# Patient Record
Sex: FEMALE | Race: BLACK OR AFRICAN AMERICAN | URBAN - METROPOLITAN AREA
[De-identification: names, ages, dates, MRNs, and addresses within clinical notes are randomized per-mention and may not be internally consistent; named-entity substitution may affect disease eponyms.]

---

## 2021-01-08 ENCOUNTER — WEB ENCOUNTER (OUTPATIENT)
Dept: URBAN - METROPOLITAN AREA CLINIC 84 | Facility: CLINIC | Age: 36
End: 2021-01-08

## 2021-01-08 ENCOUNTER — OFFICE VISIT (OUTPATIENT)
Dept: URBAN - METROPOLITAN AREA CLINIC 84 | Facility: CLINIC | Age: 36
End: 2021-01-08
Payer: COMMERCIAL

## 2021-01-08 DIAGNOSIS — R19.7 DIARRHEA: ICD-10-CM

## 2021-01-08 DIAGNOSIS — R19.4 CHANGE IN BOWEL HABIT: ICD-10-CM

## 2021-01-08 DIAGNOSIS — R10.31 RIGHT LOWER QUADRANT ABDOMINAL PAIN: ICD-10-CM

## 2021-01-08 PROCEDURE — 99204 OFFICE O/P NEW MOD 45 MIN: CPT | Performed by: INTERNAL MEDICINE

## 2021-01-08 PROCEDURE — G8427 DOCREV CUR MEDS BY ELIG CLIN: HCPCS | Performed by: INTERNAL MEDICINE

## 2021-01-08 PROCEDURE — G8420 CALC BMI NORM PARAMETERS: HCPCS | Performed by: INTERNAL MEDICINE

## 2021-01-08 PROCEDURE — G9903 PT SCRN TBCO ID AS NON USER: HCPCS | Performed by: INTERNAL MEDICINE

## 2021-01-08 PROCEDURE — G8483 FLU IMM NO ADMIN DOC REA: HCPCS | Performed by: INTERNAL MEDICINE

## 2021-01-08 RX ORDER — DICYCLOMINE HYDROCHLORIDE 20 MG/1
1- 2 TABLETS TABLET ORAL
Qty: 60 | Refills: 2 | OUTPATIENT
Start: 2021-01-08 | End: 2021-04-08

## 2021-01-08 RX ORDER — SODIUM, POTASSIUM,MAG SULFATES 17.5-3.13G
354 ML SOLUTION, RECONSTITUTED, ORAL ORAL
Qty: 354 ML | Refills: 0 | OUTPATIENT
Start: 2021-01-08 | End: 2021-01-09

## 2021-01-08 NOTE — HPI-TODAY'S VISIT:
35 year old woman presenting with multiple GI complaints.  She has been having GI symptoms for a few months.  She has been having abdominal pain.  This feels like "labor" pain.  She was having diarrhea and she needed Imodium.  Since 12/25,  she is still having loose BM's.  SHe denies LGI bleed or melena.  The pain gets worse with a BM.  The pain is a cramping pain.  The pain has been getting worse lately.  She went to the [facility] ER 2 days ago.  According to the patient she had normal labs and US.  She denies anorexia or weight loss.  She feels like the food "goes right through."  She has an urgency for a BM.  Prior to a few months ago she was having regular BM's.  She denies UGI symptoms.  She denies dysphagia.  She denies f/c/rash.  There is no Fhx of colon cancer or IBD.  She has never had a prior colonsocopy.  There has been no change in her Lithium dose.  She was staretd on Protonix and Carafate by the ER.  She denies regular EtOH.  She denies being on antibiotics prior to onset of her symptoms

## 2021-01-13 ENCOUNTER — OFFICE VISIT (OUTPATIENT)
Dept: URBAN - METROPOLITAN AREA SURGERY CENTER 20 | Facility: SURGERY CENTER | Age: 36
End: 2021-01-13
Payer: COMMERCIAL

## 2021-01-13 ENCOUNTER — TELEPHONE ENCOUNTER (OUTPATIENT)
Dept: URBAN - METROPOLITAN AREA CLINIC 92 | Facility: CLINIC | Age: 36
End: 2021-01-13

## 2021-01-13 DIAGNOSIS — K52.89 (LYMPHOCYTIC) MICROSCOPIC COLITIS: ICD-10-CM

## 2021-01-13 PROCEDURE — G8907 PT DOC NO EVENTS ON DISCHARG: HCPCS | Performed by: INTERNAL MEDICINE

## 2021-01-13 PROCEDURE — G9937 DIG OR SURV COLSCO: HCPCS | Performed by: INTERNAL MEDICINE

## 2021-01-13 PROCEDURE — 45380 COLONOSCOPY AND BIOPSY: CPT | Performed by: INTERNAL MEDICINE

## 2021-01-13 RX ORDER — MESALAMINE 1.2 G/1
4 TABLETS TABLET, DELAYED RELEASE ORAL ONCE A DAY
Qty: 120 | Refills: 5 | OUTPATIENT
Start: 2021-01-13 | End: 2021-07-12

## 2021-01-13 RX ORDER — PREDNISONE 10 MG/1
1 TABLET TABLET ORAL
Qty: 85 TABLET | Refills: 0 | OUTPATIENT
Start: 2021-01-13 | End: 2021-02-17

## 2021-01-13 RX ORDER — DICYCLOMINE HYDROCHLORIDE 20 MG/1
1- 2 TABLETS TABLET ORAL
Qty: 60 | Refills: 2 | Status: ACTIVE | COMMUNITY
Start: 2021-01-08 | End: 2021-04-08

## 2021-01-26 ENCOUNTER — OFFICE VISIT (OUTPATIENT)
Dept: URBAN - METROPOLITAN AREA CLINIC 25 | Facility: CLINIC | Age: 36
End: 2021-01-26
Payer: COMMERCIAL

## 2021-01-26 ENCOUNTER — WEB ENCOUNTER (OUTPATIENT)
Dept: URBAN - METROPOLITAN AREA CLINIC 25 | Facility: CLINIC | Age: 36
End: 2021-01-26

## 2021-01-26 VITALS
HEART RATE: 87 BPM | HEIGHT: 63 IN | TEMPERATURE: 97.5 F | BODY MASS INDEX: 23.39 KG/M2 | SYSTOLIC BLOOD PRESSURE: 107 MMHG | DIASTOLIC BLOOD PRESSURE: 74 MMHG | WEIGHT: 132 LBS

## 2021-01-26 DIAGNOSIS — R10.30 LOWER ABDOMINAL PAIN: ICD-10-CM

## 2021-01-26 DIAGNOSIS — R19.7 DIARRHEA: ICD-10-CM

## 2021-01-26 DIAGNOSIS — R68.81 EARLY SATIETY: ICD-10-CM

## 2021-01-26 DIAGNOSIS — K52.9 COLITIS: ICD-10-CM

## 2021-01-26 DIAGNOSIS — R10.13 EPIGASTRIC ABDOMINAL PAIN: ICD-10-CM

## 2021-01-26 PROCEDURE — G8483 FLU IMM NO ADMIN DOC REA: HCPCS | Performed by: INTERNAL MEDICINE

## 2021-01-26 PROCEDURE — G9903 PT SCRN TBCO ID AS NON USER: HCPCS | Performed by: INTERNAL MEDICINE

## 2021-01-26 PROCEDURE — G8427 DOCREV CUR MEDS BY ELIG CLIN: HCPCS | Performed by: INTERNAL MEDICINE

## 2021-01-26 PROCEDURE — 99214 OFFICE O/P EST MOD 30 MIN: CPT | Performed by: INTERNAL MEDICINE

## 2021-01-26 PROCEDURE — G8420 CALC BMI NORM PARAMETERS: HCPCS | Performed by: INTERNAL MEDICINE

## 2021-01-26 RX ORDER — DICYCLOMINE HYDROCHLORIDE 20 MG/1
1- 2 TABLETS TABLET ORAL
Qty: 60 | Refills: 2 | Status: ACTIVE | COMMUNITY
Start: 2021-01-08 | End: 2021-04-08

## 2021-01-26 RX ORDER — PREDNISONE 10 MG/1
1 TABLET TABLET ORAL
Qty: 85 TABLET | Refills: 0 | Status: ACTIVE | COMMUNITY
Start: 2021-01-13 | End: 2021-02-17

## 2021-01-26 RX ORDER — TRAMADOL HYDROCHLORIDE 50 MG/1
1-2 TABLETS TABLET, FILM COATED ORAL
Qty: 40 | Refills: 0 | OUTPATIENT
Start: 2021-01-26 | End: 2021-02-02

## 2021-01-26 RX ORDER — MESALAMINE 1.2 G/1
4 TABLETS TABLET, DELAYED RELEASE ORAL ONCE A DAY
Qty: 120 | Refills: 5 | Status: ACTIVE | COMMUNITY
Start: 2021-01-13 | End: 2021-07-12

## 2021-01-26 NOTE — PHYSICAL EXAM GASTROINTESTINAL
epigastrium tenderness, Abdomen , soft, nontender, nondistended , no guarding or rigidity , no masses palpable , normal bowel sounds , Liver and Spleen , no hepatomegaly present , no hepatosplenomegaly , liver nontender , spleen not palpable , left upper quadrant tenderness , right upper quadrant tenderness

## 2021-01-26 NOTE — HPI-TODAY'S VISIT:
Colonoscopy on 1/13 showed patchy inflammation that seemed c/w IBD.  We gave her a steroid taper and mesalamine.  Path did show patchy acute inflammation but no chronic changes.  She has been taking the medications.  After she started eating some regular food she started having some recurrent symptoms.  She has a lot of abdominal cramp.  This is constant.  She has trouble standing up for long periods of time.  She feels like she can't eat.  She has an epigastric pain as soon as she eats.  The nausea has improved.  She has early satiety.  She has not vomited.  She denies dysphagia.  She has lost 8 pounds since the last OV.  She has irregular BM's.  She has loose stool with mucus.  She denies LGIbleed or melena.  The pain does not decrease with the Bentyl

## 2021-01-27 ENCOUNTER — TELEPHONE ENCOUNTER (OUTPATIENT)
Dept: URBAN - METROPOLITAN AREA CLINIC 92 | Facility: CLINIC | Age: 36
End: 2021-01-27

## 2021-01-27 ENCOUNTER — OFFICE VISIT (OUTPATIENT)
Dept: URBAN - METROPOLITAN AREA SURGERY CENTER 20 | Facility: SURGERY CENTER | Age: 36
End: 2021-01-27
Payer: COMMERCIAL

## 2021-01-27 DIAGNOSIS — K29.30 CHRONIC SUPERFICIAL GASTRITIS: ICD-10-CM

## 2021-01-27 DIAGNOSIS — K21.00 ALKALINE REFLUX ESOPHAGITIS: ICD-10-CM

## 2021-01-27 PROCEDURE — 43239 EGD BIOPSY SINGLE/MULTIPLE: CPT | Performed by: INTERNAL MEDICINE

## 2021-01-27 PROCEDURE — G8907 PT DOC NO EVENTS ON DISCHARG: HCPCS | Performed by: INTERNAL MEDICINE

## 2021-01-27 RX ORDER — TRAMADOL HYDROCHLORIDE 50 MG/1
1-2 TABLETS TABLET, FILM COATED ORAL
Qty: 40 | Refills: 0 | Status: ACTIVE | COMMUNITY
Start: 2021-01-26 | End: 2021-02-02

## 2021-01-27 RX ORDER — MESALAMINE 1.2 G/1
4 TABLETS TABLET, DELAYED RELEASE ORAL ONCE A DAY
Qty: 120 | Refills: 5 | Status: ACTIVE | COMMUNITY
Start: 2021-01-13 | End: 2021-07-12

## 2021-01-27 RX ORDER — DICYCLOMINE HYDROCHLORIDE 20 MG/1
1- 2 TABLETS TABLET ORAL
Qty: 60 | Refills: 2 | Status: ACTIVE | COMMUNITY
Start: 2021-01-08 | End: 2021-04-08

## 2021-01-27 RX ORDER — PANTOPRAZOLE SODIUM 40 MG/1
1 TABLET TABLET, DELAYED RELEASE ORAL ONCE A DAY
Qty: 30 TABLET | Refills: 5 | OUTPATIENT
Start: 2021-01-27

## 2021-01-27 RX ORDER — PREDNISONE 10 MG/1
1 TABLET TABLET ORAL
Qty: 85 TABLET | Refills: 0 | Status: ACTIVE | COMMUNITY
Start: 2021-01-13 | End: 2021-02-17

## 2021-02-05 ENCOUNTER — OFFICE VISIT (OUTPATIENT)
Dept: URBAN - METROPOLITAN AREA CLINIC 84 | Facility: CLINIC | Age: 36
End: 2021-02-05
Payer: COMMERCIAL

## 2021-02-05 DIAGNOSIS — K51.90 ULCERATIVE COLITIS: ICD-10-CM

## 2021-02-05 PROCEDURE — G8420 CALC BMI NORM PARAMETERS: HCPCS | Performed by: INTERNAL MEDICINE

## 2021-02-05 PROCEDURE — G8427 DOCREV CUR MEDS BY ELIG CLIN: HCPCS | Performed by: INTERNAL MEDICINE

## 2021-02-05 PROCEDURE — G9903 PT SCRN TBCO ID AS NON USER: HCPCS | Performed by: INTERNAL MEDICINE

## 2021-02-05 PROCEDURE — 99213 OFFICE O/P EST LOW 20 MIN: CPT | Performed by: INTERNAL MEDICINE

## 2021-02-05 PROCEDURE — G8483 FLU IMM NO ADMIN DOC REA: HCPCS | Performed by: INTERNAL MEDICINE

## 2021-02-05 RX ORDER — MESALAMINE 1.2 G/1
4 TABLETS TABLET, DELAYED RELEASE ORAL ONCE A DAY
Qty: 120 | Refills: 5 | Status: ACTIVE | COMMUNITY
Start: 2021-01-13 | End: 2021-07-12

## 2021-02-05 RX ORDER — PREDNISONE 10 MG/1
1 TABLET TABLET ORAL
Qty: 85 TABLET | Refills: 0 | Status: ACTIVE | COMMUNITY
Start: 2021-01-13 | End: 2021-02-17

## 2021-02-05 RX ORDER — DICYCLOMINE HYDROCHLORIDE 20 MG/1
1- 2 TABLETS TABLET ORAL
Qty: 60 | Refills: 2 | Status: ACTIVE | COMMUNITY
Start: 2021-01-08 | End: 2021-04-08

## 2021-02-05 RX ORDER — PANTOPRAZOLE SODIUM 40 MG/1
1 TABLET TABLET, DELAYED RELEASE ORAL ONCE A DAY
Qty: 30 TABLET | Refills: 5 | Status: ACTIVE | COMMUNITY
Start: 2021-01-27

## 2021-02-05 NOTE — HPI-TODAY'S VISIT:
EGD last week had some mild gastritis.  We are still awaiting path results.  She went to the [facility] ER on 2/2.  She had a CT Scan there but we do not have those results.  She was told that she had colitis.  They prescribed Cipro/Percocet/Flagyl.  She is still on the Prednisone/Lialda/Bentyl.  They also told her to stay on Prednisone 40 mg a day. She had been down to 20 mg a day. Her pain increases when she eats.  She has a constant pain.  It's a lower abdominal pain. The pain is sharp/spasm/cramp/heaviness.  She has regular BM's.  She denies constipation.  She denies LGI bleed.  She denies UGI symptoms.  She has not been taking the Bentyl.  She has been taking percocet from the ER.  Upon further questioning she has been having watery stools.

## 2021-02-11 ENCOUNTER — TELEPHONE ENCOUNTER (OUTPATIENT)
Dept: URBAN - METROPOLITAN AREA SURGERY CENTER 30 | Facility: SURGERY CENTER | Age: 36
End: 2021-02-11

## 2021-02-22 ENCOUNTER — TELEPHONE ENCOUNTER (OUTPATIENT)
Dept: URBAN - METROPOLITAN AREA CLINIC 84 | Facility: CLINIC | Age: 36
End: 2021-02-22

## 2021-03-03 ENCOUNTER — TELEPHONE ENCOUNTER (OUTPATIENT)
Dept: URBAN - METROPOLITAN AREA CLINIC 25 | Facility: CLINIC | Age: 36
End: 2021-03-03

## 2021-03-03 RX ORDER — DICYCLOMINE HYDROCHLORIDE 20 MG/1
1- 2 TABLETS TABLET ORAL
Qty: 60 | Refills: 2 | Status: ACTIVE | COMMUNITY
Start: 2021-01-08 | End: 2021-04-08

## 2021-03-03 RX ORDER — MESALAMINE 1.2 G/1
4 TABLETS TABLET, DELAYED RELEASE ORAL ONCE A DAY
Qty: 120 | Refills: 5 | Status: ACTIVE | COMMUNITY
Start: 2021-01-13 | End: 2021-07-12

## 2021-03-03 RX ORDER — PANTOPRAZOLE SODIUM 40 MG/1
1 TABLET TABLET, DELAYED RELEASE ORAL ONCE A DAY
Qty: 30 TABLET | Refills: 5 | Status: ACTIVE | COMMUNITY
Start: 2021-01-27

## 2021-03-03 RX ORDER — OXYCODONE HYDROCHLORIDE AND ACETAMINOPHEN 5; 325 MG/1; MG/1
1-2 TABLET AS NEEDED TABLET ORAL
Qty: 40 | Refills: 0 | OUTPATIENT
Start: 2021-03-03 | End: 2021-03-17

## 2021-03-05 ENCOUNTER — TELEPHONE ENCOUNTER (OUTPATIENT)
Dept: URBAN - METROPOLITAN AREA CLINIC 92 | Facility: CLINIC | Age: 36
End: 2021-03-05

## 2021-03-05 PROBLEM — 50440006: Status: ACTIVE | Noted: 2021-03-05

## 2021-03-13 LAB
ALBUMIN: 3.8
ALKALINE PHOSPHATASE: 73
ALT (SGPT): 63
AST (SGOT): 39
BASO (ABSOLUTE): 0.1
BASOS: 0
BILIRUBIN, DIRECT: 0.13
BILIRUBIN, TOTAL: 0.4
BUN/CREATININE RATIO: 13
BUN: 7
C-REACTIVE PROTEIN, QUANT: 43
CARBON DIOXIDE, TOTAL: 23
CHLORIDE: 100
CREATININE: 0.55
EGFR IF AFRICN AM: 138
EGFR IF NONAFRICN AM: 119
EOS (ABSOLUTE): 0
EOS: 0
FERRITIN, SERUM: 478
FOLATE (FOLIC ACID), SERUM: 15.2
GLUCOSE: 102
HBSAG SCREEN: NEGATIVE
HEMATOCRIT: 43.8
HEMATOLOGY COMMENTS:: (no result)
HEMOGLOBIN: 13.5
HEP B SURFACE AB, QUAL: REACTIVE
IMMATURE CELLS: (no result)
IMMATURE GRANS (ABS): 0
IMMATURE GRANULOCYTES: 0
IRON BIND.CAP.(TIBC): 206
IRON SATURATION: 11
IRON: 23
LYMPHS (ABSOLUTE): 2.1
LYMPHS: 10
MCH: 26.3
MCHC: 30.8
MCV: 85
MONOCYTES(ABSOLUTE): 1
MONOCYTES: 5
NEUTROPHILS (ABSOLUTE): 18
NEUTROPHILS: 85
NRBC: (no result)
PLATELETS: 393
POTASSIUM: 4.3
PROTEIN, TOTAL: 5.8
QUANTIFERON CRITERIA: (no result)
QUANTIFERON INCUBATION: (no result)
QUANTIFERON MITOGEN VALUE: >10
QUANTIFERON NIL VALUE: 0.05
QUANTIFERON TB1 AG VALUE: 0.05
QUANTIFERON TB2 AG VALUE: 0.05
QUANTIFERON-TB GOLD PLUS: NEGATIVE
RBC: 5.14
RDW: 13.8
SODIUM: 137
UIBC: 183
VITAMIN B12: 1494
WBC: 21.3

## 2021-03-15 ENCOUNTER — TELEPHONE ENCOUNTER (OUTPATIENT)
Dept: URBAN - METROPOLITAN AREA CLINIC 84 | Facility: CLINIC | Age: 36
End: 2021-03-15

## 2021-03-15 RX ORDER — PANTOPRAZOLE SODIUM 40 MG/1
1 TABLET TABLET, DELAYED RELEASE ORAL ONCE A DAY
Qty: 30 TABLET | Refills: 5 | Status: ACTIVE | COMMUNITY
Start: 2021-01-27

## 2021-03-15 RX ORDER — MESALAMINE 1.2 G/1
4 TABLETS TABLET, DELAYED RELEASE ORAL ONCE A DAY
Qty: 120 | Refills: 5 | Status: ACTIVE | COMMUNITY
Start: 2021-01-13 | End: 2021-07-12

## 2021-03-15 RX ORDER — INFLIXIMAB 100 MG/10ML
AS DIRECTED INJECTION, POWDER, LYOPHILIZED, FOR SOLUTION INTRAVENOUS AS DIRECTED
Qty: 10 | Refills: 6 | OUTPATIENT
End: 2022-12-05

## 2021-03-15 RX ORDER — DICYCLOMINE HYDROCHLORIDE 20 MG/1
1- 2 TABLETS TABLET ORAL
Qty: 60 | Refills: 2 | Status: ACTIVE | COMMUNITY
Start: 2021-01-08 | End: 2021-04-08

## 2021-03-15 RX ORDER — OXYCODONE HYDROCHLORIDE AND ACETAMINOPHEN 5; 325 MG/1; MG/1
1-2 TABLET AS NEEDED TABLET ORAL
Qty: 40 | Refills: 0 | Status: ACTIVE | COMMUNITY
Start: 2021-03-03 | End: 2021-03-17

## 2021-03-16 ENCOUNTER — TELEPHONE ENCOUNTER (OUTPATIENT)
Dept: URBAN - METROPOLITAN AREA CLINIC 84 | Facility: CLINIC | Age: 36
End: 2021-03-16

## 2021-03-18 ENCOUNTER — TELEPHONE ENCOUNTER (OUTPATIENT)
Dept: URBAN - METROPOLITAN AREA CLINIC 84 | Facility: CLINIC | Age: 36
End: 2021-03-18

## 2021-03-19 ENCOUNTER — TELEPHONE ENCOUNTER (OUTPATIENT)
Dept: URBAN - METROPOLITAN AREA CLINIC 84 | Facility: CLINIC | Age: 36
End: 2021-03-19

## 2021-03-23 PROBLEM — 71833008 CROHN'S DISEASE OF SMALL AND LARGE INTESTINES: Status: ACTIVE | Noted: 2021-03-16

## 2021-03-27 ENCOUNTER — OUT OF OFFICE VISIT (OUTPATIENT)
Dept: URBAN - METROPOLITAN AREA MEDICAL CENTER 17 | Facility: MEDICAL CENTER | Age: 36
End: 2021-03-27
Payer: COMMERCIAL

## 2021-03-27 DIAGNOSIS — R10.84 ABDOMINAL CRAMPING, GENERALIZED: ICD-10-CM

## 2021-03-27 DIAGNOSIS — K63.89 BACTERIAL OVERGROWTH SYNDROME: ICD-10-CM

## 2021-03-27 DIAGNOSIS — R93.3 ABN FINDINGS-GI TRACT: ICD-10-CM

## 2021-03-27 PROCEDURE — 99254 IP/OBS CNSLTJ NEW/EST MOD 60: CPT | Performed by: INTERNAL MEDICINE

## 2021-03-27 PROCEDURE — 99232 SBSQ HOSP IP/OBS MODERATE 35: CPT | Performed by: INTERNAL MEDICINE

## 2021-03-28 ENCOUNTER — TELEPHONE ENCOUNTER (OUTPATIENT)
Dept: URBAN - METROPOLITAN AREA CLINIC 92 | Facility: CLINIC | Age: 36
End: 2021-03-28

## 2021-03-30 ENCOUNTER — OFFICE VISIT (OUTPATIENT)
Dept: URBAN - METROPOLITAN AREA CLINIC 114 | Facility: CLINIC | Age: 36
End: 2021-03-30

## 2021-04-02 ENCOUNTER — WEB ENCOUNTER (OUTPATIENT)
Dept: URBAN - METROPOLITAN AREA CLINIC 25 | Facility: CLINIC | Age: 36
End: 2021-04-02

## 2021-04-02 ENCOUNTER — OFFICE VISIT (OUTPATIENT)
Dept: URBAN - METROPOLITAN AREA CLINIC 25 | Facility: CLINIC | Age: 36
End: 2021-04-02
Payer: COMMERCIAL

## 2021-04-02 VITALS
BODY MASS INDEX: 23.81 KG/M2 | SYSTOLIC BLOOD PRESSURE: 131 MMHG | DIASTOLIC BLOOD PRESSURE: 83 MMHG | TEMPERATURE: 97.5 F | WEIGHT: 134.4 LBS | HEART RATE: 85 BPM | HEIGHT: 63 IN

## 2021-04-02 DIAGNOSIS — A49.8 CLOSTRIDIOIDES DIFFICILE INFECTION: ICD-10-CM

## 2021-04-02 DIAGNOSIS — A09 DIARRHEA OF INFECTIOUS ORIGIN: ICD-10-CM

## 2021-04-02 DIAGNOSIS — K21.9 GERD: ICD-10-CM

## 2021-04-02 DIAGNOSIS — K52.9 COLITIS: ICD-10-CM

## 2021-04-02 PROBLEM — 235595009 GASTROESOPHAGEAL REFLUX DISEASE: Status: ACTIVE | Noted: 2021-01-27

## 2021-04-02 PROCEDURE — 99214 OFFICE O/P EST MOD 30 MIN: CPT | Performed by: INTERNAL MEDICINE

## 2021-04-02 RX ORDER — MESALAMINE 1.2 G/1
4 TABLETS TABLET, DELAYED RELEASE ORAL ONCE A DAY
Qty: 120 | Refills: 5 | Status: ACTIVE | COMMUNITY
Start: 2021-01-13 | End: 2021-07-12

## 2021-04-02 RX ORDER — PANTOPRAZOLE SODIUM 40 MG/1
1 TABLET TABLET, DELAYED RELEASE ORAL ONCE A DAY
Qty: 30 TABLET | Refills: 5 | Status: ON HOLD | COMMUNITY
Start: 2021-01-27

## 2021-04-02 RX ORDER — INFLIXIMAB 100 MG/10ML
AS DIRECTED INJECTION, POWDER, LYOPHILIZED, FOR SOLUTION INTRAVENOUS AS DIRECTED
Qty: 10 | Refills: 6 | Status: ON HOLD | COMMUNITY
End: 2022-12-05

## 2021-04-02 RX ORDER — DICYCLOMINE HYDROCHLORIDE 20 MG/1
1- 2 TABLETS TABLET ORAL
Qty: 60 | Refills: 2 | Status: ON HOLD | COMMUNITY
Start: 2021-01-08 | End: 2021-04-08

## 2021-04-02 NOTE — HPI-OTHER HISTORIES
Feb 2021 : dr gray EGD last week had some mild gastritis.  We are still awaiting path results.  She went to the [facility] ER on 2/2.  She had a CT Scan there but we do not have those results.  She was told that she had colitis.  They prescribed Cipro/Percocet/Flagyl.  She is still on the Prednisone/Lialda/Bentyl.  They also told her to stay on Prednisone 40 mg a day. She had been down to 20 mg a day. Her pain increases when she eats.  She has a constant pain.  It's a lower abdominal pain. The pain is sharp/spasm/cramp/heaviness.  She has regular BM's.  She denies constipation.  She denies LGI bleed.  She denies UGI symptoms.  She has not been taking the Bentyl.  She has been taking percocet from the ER.  Upon further questioning she has been having watery stools.

## 2021-04-02 NOTE — HPI-TODAY'S VISIT:
April 2021: On vancomycin. bowel consistency is better. no abdominal pain. not on steroids. also stopped mesalamine. reports on - off symptoms for over a yr but was always told she had IBS. Took imdoium prn.   EGD in Jan 2021 revealed LA grade a esophagitis and gastritis, path did not reveal any H. pylori, small bowel biopsies were benign. Colonoscopy in Jan 2021 revealed normal TI, patchy moderate inflammation involving the entire colon.  Normal TI biopsy, focal active colitis in ascending colon and active colitis in the sigmoid colon with remaining colon biopsies including back from the rectum being normal. Prometheus panel revealed a pattern consistent with IBD and Dr. Fuentes wanted to  start the patient on Remicade.  She was hospitalized in March 2021 at [facility] with flareup and was initially treated with Cipro and Flagyl as well as IV steroids which did improve her symptoms, CT showed colitis, leukocytosis on labs, stool tests revealed presence of C. difficile without toxin, therefore patient was sent home on Flagyl and vancomycin and advised to hold off on steroids and continue Lialda.

## 2021-04-06 ENCOUNTER — OFFICE VISIT (OUTPATIENT)
Dept: URBAN - METROPOLITAN AREA CLINIC 25 | Facility: CLINIC | Age: 36
End: 2021-04-06
Payer: COMMERCIAL

## 2021-04-06 VITALS
WEIGHT: 134.4 LBS | SYSTOLIC BLOOD PRESSURE: 117 MMHG | BODY MASS INDEX: 23.81 KG/M2 | DIASTOLIC BLOOD PRESSURE: 81 MMHG | HEART RATE: 96 BPM | HEIGHT: 63 IN | TEMPERATURE: 97.6 F

## 2021-04-06 DIAGNOSIS — A04.72 C. DIFFICILE COLITIS: ICD-10-CM

## 2021-04-06 DIAGNOSIS — K51.90 ULCERATIVE COLITIS: ICD-10-CM

## 2021-04-06 PROCEDURE — 99213 OFFICE O/P EST LOW 20 MIN: CPT | Performed by: INTERNAL MEDICINE

## 2021-04-06 RX ORDER — PANTOPRAZOLE SODIUM 40 MG/1
1 TABLET TABLET, DELAYED RELEASE ORAL ONCE A DAY
Qty: 30 TABLET | Refills: 5 | COMMUNITY
Start: 2021-01-27

## 2021-04-06 RX ORDER — MESALAMINE 1.2 G/1
4 TABLETS TABLET, DELAYED RELEASE ORAL ONCE A DAY
Qty: 120 | Refills: 5 | Status: ACTIVE | COMMUNITY
Start: 2021-01-13 | End: 2021-07-12

## 2021-04-06 RX ORDER — INFLIXIMAB 100 MG/10ML
AS DIRECTED INJECTION, POWDER, LYOPHILIZED, FOR SOLUTION INTRAVENOUS AS DIRECTED
Qty: 10 | Refills: 6 | COMMUNITY
End: 2022-12-05

## 2021-04-06 RX ORDER — DICYCLOMINE HYDROCHLORIDE 20 MG/1
1- 2 TABLETS TABLET ORAL
Qty: 60 | Refills: 2 | COMMUNITY
Start: 2021-01-08 | End: 2021-04-08

## 2021-04-06 NOTE — HPI-TODAY'S VISIT:
Patient seen by Dr. Fernandez last week.  She is on Day # 9 of [facility].  She feels a lot better.  She still has loose stool.  Her stool frequency has decreased.  She still has about 5 loose BM's a day.  She denies hematochezia.  She denies abdominal pain.  Her stool no longer has a foul odor. The foul odor was more recent.  She is no longer on the Prednisone.  She has not gotten any Remicade yet.  She denies anorexia or weight loss.  She denies significant UGI symptoms

## 2021-04-13 ENCOUNTER — OFFICE VISIT (OUTPATIENT)
Dept: URBAN - METROPOLITAN AREA CLINIC 114 | Facility: CLINIC | Age: 36
End: 2021-04-13

## 2021-04-19 LAB
C DIFFICILE TOXIN GENE NAA: NEGATIVE
C DIFFICILE TOXINS A+B, EIA: NEGATIVE

## 2021-04-29 ENCOUNTER — TELEPHONE ENCOUNTER (OUTPATIENT)
Dept: URBAN - METROPOLITAN AREA CLINIC 25 | Facility: CLINIC | Age: 36
End: 2021-04-29

## 2021-05-11 ENCOUNTER — OFFICE VISIT (OUTPATIENT)
Dept: URBAN - METROPOLITAN AREA CLINIC 114 | Facility: CLINIC | Age: 36
End: 2021-05-11

## 2021-06-16 ENCOUNTER — OFFICE VISIT (OUTPATIENT)
Dept: URBAN - METROPOLITAN AREA CLINIC 25 | Facility: CLINIC | Age: 36
End: 2021-06-16
Payer: COMMERCIAL

## 2021-06-16 VITALS
DIASTOLIC BLOOD PRESSURE: 84 MMHG | TEMPERATURE: 98.6 F | WEIGHT: 141 LBS | SYSTOLIC BLOOD PRESSURE: 120 MMHG | HEIGHT: 63 IN | BODY MASS INDEX: 24.98 KG/M2 | HEART RATE: 76 BPM

## 2021-06-16 DIAGNOSIS — K51.90 ULCERATIVE COLITIS: ICD-10-CM

## 2021-06-16 DIAGNOSIS — A04.72 C. DIFFICILE COLITIS: ICD-10-CM

## 2021-06-16 PROCEDURE — 99213 OFFICE O/P EST LOW 20 MIN: CPT | Performed by: INTERNAL MEDICINE

## 2021-06-16 RX ORDER — INFLIXIMAB 100 MG/10ML
AS DIRECTED INJECTION, POWDER, LYOPHILIZED, FOR SOLUTION INTRAVENOUS AS DIRECTED
Qty: 10 | Refills: 6 | COMMUNITY
End: 2022-12-05

## 2021-06-16 RX ORDER — PANTOPRAZOLE SODIUM 40 MG/1
1 TABLET TABLET, DELAYED RELEASE ORAL ONCE A DAY
Qty: 30 TABLET | Refills: 5 | COMMUNITY
Start: 2021-01-27

## 2021-06-16 RX ORDER — MESALAMINE 1.2 G/1
4 TABLETS TABLET, DELAYED RELEASE ORAL ONCE A DAY
Qty: 120 | Refills: 5 | COMMUNITY
Start: 2021-01-13 | End: 2021-07-12

## 2021-06-16 NOTE — HPI-TODAY'S VISIT:
Patient last seen 4/2021.  Stool C Diff toxin and PCR were negative.  The insurance company denied Remicade and wanted her to first try Inflectra and stated we would first need a peer to peer for this.  Since she finished Vanco she had been feeling fine.  She was not having pain.  She was having diarrhea even after she compelted the Vanco. She has 3-4 loose/soft BM's a day.  She denies LGI bleed or melena.  Over the past 2 weeks she has had some FI.  She has been having lower abdominal cramping pain.  Her stools have been more loose over the past 2 weeks.  She has had increased frequency.  She has urgency for a BM.  She feels like over the pat 2 weeks her symptoms have gotten worse.  She denies anroexia or weight loss.  She denies UGI symptoms.  She was on Augmentin a few weeks ago.  Her increased cramping and loose stools started prior to being on the antibiotics

## 2021-06-28 ENCOUNTER — TELEPHONE ENCOUNTER (OUTPATIENT)
Dept: URBAN - METROPOLITAN AREA CLINIC 84 | Facility: CLINIC | Age: 36
End: 2021-06-28

## 2021-07-08 LAB — C DIFFICILE TOXINS A+B, EIA: NEGATIVE

## 2021-07-19 ENCOUNTER — TELEPHONE ENCOUNTER (OUTPATIENT)
Dept: URBAN - METROPOLITAN AREA CLINIC 84 | Facility: CLINIC | Age: 36
End: 2021-07-19

## 2021-08-16 ENCOUNTER — TELEPHONE ENCOUNTER (OUTPATIENT)
Dept: URBAN - METROPOLITAN AREA CLINIC 84 | Facility: CLINIC | Age: 36
End: 2021-08-16

## 2021-08-23 ENCOUNTER — OFFICE VISIT (OUTPATIENT)
Dept: URBAN - METROPOLITAN AREA SURGERY CENTER 20 | Facility: SURGERY CENTER | Age: 36
End: 2021-08-23
Payer: COMMERCIAL

## 2021-08-23 DIAGNOSIS — R19.7 ACUTE DIARRHEA: ICD-10-CM

## 2021-08-23 PROCEDURE — G8907 PT DOC NO EVENTS ON DISCHARG: HCPCS | Performed by: INTERNAL MEDICINE

## 2021-08-23 PROCEDURE — 45331 SIGMOIDOSCOPY AND BIOPSY: CPT | Performed by: INTERNAL MEDICINE

## 2021-08-23 RX ORDER — INFLIXIMAB 100 MG/10ML
AS DIRECTED INJECTION, POWDER, LYOPHILIZED, FOR SOLUTION INTRAVENOUS AS DIRECTED
Qty: 10 | Refills: 6 | COMMUNITY
End: 2022-12-05

## 2021-08-23 RX ORDER — PANTOPRAZOLE SODIUM 40 MG/1
1 TABLET TABLET, DELAYED RELEASE ORAL ONCE A DAY
Qty: 30 TABLET | Refills: 5 | COMMUNITY
Start: 2021-01-27

## 2021-09-02 ENCOUNTER — TELEPHONE ENCOUNTER (OUTPATIENT)
Dept: URBAN - METROPOLITAN AREA CLINIC 92 | Facility: CLINIC | Age: 36
End: 2021-09-02

## 2022-02-02 ENCOUNTER — OFFICE VISIT (OUTPATIENT)
Dept: URBAN - METROPOLITAN AREA CLINIC 25 | Facility: CLINIC | Age: 37
End: 2022-02-02
Payer: COMMERCIAL

## 2022-02-02 VITALS
HEART RATE: 73 BPM | BODY MASS INDEX: 26.93 KG/M2 | HEIGHT: 63 IN | DIASTOLIC BLOOD PRESSURE: 81 MMHG | SYSTOLIC BLOOD PRESSURE: 124 MMHG | TEMPERATURE: 98 F | WEIGHT: 152 LBS

## 2022-02-02 DIAGNOSIS — A04.72 C. DIFFICILE COLITIS: ICD-10-CM

## 2022-02-02 DIAGNOSIS — K51.90 ULCERATIVE COLITIS: ICD-10-CM

## 2022-02-02 PROCEDURE — 99213 OFFICE O/P EST LOW 20 MIN: CPT | Performed by: INTERNAL MEDICINE

## 2022-02-02 RX ORDER — PANTOPRAZOLE SODIUM 40 MG/1
1 TABLET TABLET, DELAYED RELEASE ORAL ONCE A DAY
Qty: 30 TABLET | Refills: 5 | COMMUNITY
Start: 2021-01-27

## 2022-02-02 RX ORDER — DICYCLOMINE HYDROCHLORIDE 20 MG/1
1- 2 TABLETS TABLET ORAL
Qty: 60 | Refills: 5 | OUTPATIENT
Start: 2022-02-02 | End: 2022-08-01

## 2022-02-02 RX ORDER — INFLIXIMAB 100 MG/10ML
AS DIRECTED INJECTION, POWDER, LYOPHILIZED, FOR SOLUTION INTRAVENOUS AS DIRECTED
Qty: 10 | Refills: 6 | COMMUNITY
End: 2022-12-05

## 2022-02-02 NOTE — HPI-TODAY'S VISIT:
Flex Sig in 8/2021 was normal.  Overall she is doing well.  She ahs occasional abdominal pain.  This responds to Bentyl PRN.  Her bowels are moving well.  She has formed stool.  She has 1-2 BM's a day based on her diet.  She denies LGI bleed or melena.  She denies anorexia or weight loss.  She denies UGI symptoms.  She uses PPI PRN.  Overall she is satisfied her symptoms

## 2022-02-08 PROBLEM — 64766004 ULCERATIVE COLITIS: Status: ACTIVE | Noted: 2021-02-05

## 2023-11-15 ENCOUNTER — OFFICE VISIT (OUTPATIENT)
Dept: URBAN - METROPOLITAN AREA CLINIC 25 | Facility: CLINIC | Age: 38
End: 2023-11-15

## 2023-11-17 ENCOUNTER — OFFICE VISIT (OUTPATIENT)
Dept: URBAN - METROPOLITAN AREA CLINIC 84 | Facility: CLINIC | Age: 38
End: 2023-11-17

## 2023-11-17 ENCOUNTER — DASHBOARD ENCOUNTERS (OUTPATIENT)
Age: 38
End: 2023-11-17

## 2023-11-17 ENCOUNTER — OFFICE VISIT (OUTPATIENT)
Dept: URBAN - METROPOLITAN AREA CLINIC 84 | Facility: CLINIC | Age: 38
End: 2023-11-17
Payer: COMMERCIAL

## 2023-11-17 VITALS
HEIGHT: 63 IN | TEMPERATURE: 97.3 F | WEIGHT: 155.2 LBS | DIASTOLIC BLOOD PRESSURE: 80 MMHG | BODY MASS INDEX: 27.5 KG/M2 | HEART RATE: 83 BPM | SYSTOLIC BLOOD PRESSURE: 116 MMHG

## 2023-11-17 DIAGNOSIS — R10.30 LOWER ABDOMINAL PAIN: ICD-10-CM

## 2023-11-17 DIAGNOSIS — R68.81 EARLY SATIETY: ICD-10-CM

## 2023-11-17 DIAGNOSIS — K21.9 GERD: ICD-10-CM

## 2023-11-17 DIAGNOSIS — R11.0 NAUSEA: ICD-10-CM

## 2023-11-17 DIAGNOSIS — K51.90 ULCERATIVE COLITIS: ICD-10-CM

## 2023-11-17 PROCEDURE — 99214 OFFICE O/P EST MOD 30 MIN: CPT | Performed by: INTERNAL MEDICINE

## 2023-11-17 RX ORDER — PANTOPRAZOLE SODIUM 40 MG/1
1 TABLET TABLET, DELAYED RELEASE ORAL ONCE A DAY
Qty: 30 TABLET | Refills: 5 | OUTPATIENT
Start: 2023-11-17

## 2023-11-17 RX ORDER — DICYCLOMINE HYDROCHLORIDE 20 MG/1
1- 2 TABLETS TABLET ORAL
Qty: 60 | Refills: 5
Start: 2022-02-02 | End: 2024-05-15

## 2023-11-17 RX ORDER — PANTOPRAZOLE SODIUM 40 MG/1
1 TABLET TABLET, DELAYED RELEASE ORAL ONCE A DAY
Qty: 30 TABLET | Refills: 5 | COMMUNITY
Start: 2021-01-27

## 2023-11-17 RX ORDER — POLYETHYLENE GLYCOL 3350, SODIUM SULFATE, SODIUM CHLORIDE, POTASSIUM CHLORIDE, ASCORBIC ACID, SODIUM ASCORBATE 140-9-5.2G
AS DIRECTED KIT ORAL AS DIRECTED
Qty: 1 BOX | Refills: 0 | OUTPATIENT
Start: 2023-11-17 | End: 2023-11-18

## 2023-11-17 RX ORDER — PANTOPRAZOLE SODIUM 40 MG/1
1 TABLET TABLET, DELAYED RELEASE ORAL ONCE A DAY
Qty: 30 TABLET | Refills: 5 | Status: ACTIVE | COMMUNITY
Start: 2021-01-27

## 2023-11-17 NOTE — HPI-TODAY'S VISIT:
Patient last seen 2/2022.  She has abdominal cramp.  This does respond to Bentyl but sometimes it does not help.  The pain is more frequent and is sometimes severe.  She denies anorexia or weight loss.  Over the past 2 months she has had 2 episodes of severe constipation.  She had some blood on the tisse a few days again.  She denies diarrhea.  The pain gets worse if she doesn't have a BM.  She did not respond to Colace.  The pain does decrease with a BM.  She takes Protonix daily.  She has heartburn.  She denies supine symptoms.  She has nasuea but no vomiting.  She denies dysphagia.  Her UGI symptoms are not related to her constipation.  Prior Prometheus testing indicated that she has Crohns.  Prior Colonscopy in 1/2021 had active inflammation with no chronicity.  Flex Sig in 8/2021 was compeltely normal.  EGD was overall normal

## 2023-11-22 ENCOUNTER — OFFICE VISIT (OUTPATIENT)
Dept: URBAN - METROPOLITAN AREA CLINIC 25 | Facility: CLINIC | Age: 38
End: 2023-11-22

## 2023-11-27 ENCOUNTER — TELEPHONE ENCOUNTER (OUTPATIENT)
Dept: URBAN - METROPOLITAN AREA CLINIC 84 | Facility: CLINIC | Age: 38
End: 2023-11-27

## 2023-11-28 ENCOUNTER — TELEPHONE ENCOUNTER (OUTPATIENT)
Dept: URBAN - METROPOLITAN AREA CLINIC 84 | Facility: CLINIC | Age: 38
End: 2023-11-28

## 2023-12-12 ENCOUNTER — TELEPHONE ENCOUNTER (OUTPATIENT)
Dept: URBAN - METROPOLITAN AREA CLINIC 84 | Facility: CLINIC | Age: 38
End: 2023-12-12

## 2023-12-14 ENCOUNTER — OFFICE VISIT (OUTPATIENT)
Dept: URBAN - METROPOLITAN AREA SURGERY CENTER 20 | Facility: SURGERY CENTER | Age: 38
End: 2023-12-14

## 2023-12-14 ENCOUNTER — TELEPHONE ENCOUNTER (OUTPATIENT)
Dept: URBAN - METROPOLITAN AREA CLINIC 84 | Facility: CLINIC | Age: 38
End: 2023-12-14

## 2023-12-20 ENCOUNTER — CLAIMS CREATED FROM THE CLAIM WINDOW (OUTPATIENT)
Dept: URBAN - METROPOLITAN AREA CLINIC 4 | Facility: CLINIC | Age: 38
End: 2023-12-20
Payer: COMMERCIAL

## 2023-12-20 ENCOUNTER — OFFICE VISIT (OUTPATIENT)
Dept: URBAN - METROPOLITAN AREA SURGERY CENTER 20 | Facility: SURGERY CENTER | Age: 38
End: 2023-12-20

## 2023-12-20 ENCOUNTER — OUT OF OFFICE VISIT (OUTPATIENT)
Dept: URBAN - METROPOLITAN AREA SURGERY CENTER 20 | Facility: SURGERY CENTER | Age: 38
End: 2023-12-20
Payer: COMMERCIAL

## 2023-12-20 ENCOUNTER — TELEPHONE ENCOUNTER (OUTPATIENT)
Dept: URBAN - METROPOLITAN AREA CLINIC 84 | Facility: CLINIC | Age: 38
End: 2023-12-20

## 2023-12-20 ENCOUNTER — LAB OUTSIDE AN ENCOUNTER (OUTPATIENT)
Dept: URBAN - METROPOLITAN AREA CLINIC 84 | Facility: CLINIC | Age: 38
End: 2023-12-20

## 2023-12-20 DIAGNOSIS — R10.32 ABDOMINAL CRAMPING IN LEFT LOWER QUADRANT: ICD-10-CM

## 2023-12-20 DIAGNOSIS — K29.70 CHRONIC ACITVE GASTRITIS (H.PYLORI NEGATIVE): ICD-10-CM

## 2023-12-20 DIAGNOSIS — K20.80 ESOPHAGITIS DISSECANS SUPERFICIALIS: ICD-10-CM

## 2023-12-20 DIAGNOSIS — K21.00 ALKALINE REFLUX ESOPHAGITIS: ICD-10-CM

## 2023-12-20 DIAGNOSIS — R68.81 EARLY SATIETY: ICD-10-CM

## 2023-12-20 DIAGNOSIS — K31.89 OTHER DISEASES OF STOMACH AND DUODENUM: ICD-10-CM

## 2023-12-20 DIAGNOSIS — R10.13 ABDOMINAL DISCOMFORT, EPIGASTRIC: ICD-10-CM

## 2023-12-20 DIAGNOSIS — R10.31 ABDOMINAL CRAMPING IN RIGHT LOWER QUADRANT: ICD-10-CM

## 2023-12-20 DIAGNOSIS — R11.0 NAUSEA: ICD-10-CM

## 2023-12-20 DIAGNOSIS — K29.60 ADENOPAPILLOMATOSIS GASTRICA: ICD-10-CM

## 2023-12-20 DIAGNOSIS — K51.90 ACUTE ULCERATIVE COLITIS: ICD-10-CM

## 2023-12-20 PROCEDURE — 45378 DIAGNOSTIC COLONOSCOPY: CPT | Performed by: INTERNAL MEDICINE

## 2023-12-20 PROCEDURE — G8907 PT DOC NO EVENTS ON DISCHARG: HCPCS | Performed by: INTERNAL MEDICINE

## 2023-12-20 PROCEDURE — 43239 EGD BIOPSY SINGLE/MULTIPLE: CPT | Performed by: INTERNAL MEDICINE

## 2023-12-20 PROCEDURE — 00813 ANES UPR LWR GI NDSC PX: CPT | Performed by: NURSE ANESTHETIST, CERTIFIED REGISTERED

## 2023-12-20 PROCEDURE — 88312 SPECIAL STAINS GROUP 1: CPT | Performed by: PATHOLOGY

## 2023-12-20 PROCEDURE — 88305 TISSUE EXAM BY PATHOLOGIST: CPT | Performed by: PATHOLOGY

## 2023-12-20 RX ORDER — LINACLOTIDE 145 UG/1
1 CAPSULE AT LEAST 30 MINUTES BEFORE THE FIRST MEAL OF THE DAY ON AN EMPTY STOMACH CAPSULE, GELATIN COATED ORAL ONCE A DAY
Qty: 90 | Refills: 3 | OUTPATIENT
Start: 2023-12-20 | End: 2024-12-14

## 2023-12-20 RX ORDER — PANTOPRAZOLE SODIUM 40 MG/1
1 TABLET TABLET, DELAYED RELEASE ORAL ONCE A DAY
Qty: 30 TABLET | Refills: 5 | Status: ACTIVE | COMMUNITY
Start: 2023-11-17

## 2023-12-20 RX ORDER — DICYCLOMINE HYDROCHLORIDE 20 MG/1
1- 2 TABLETS TABLET ORAL
Qty: 60 | Refills: 5 | Status: ACTIVE | COMMUNITY
Start: 2022-02-02 | End: 2024-05-15

## 2023-12-20 RX ORDER — PANTOPRAZOLE SODIUM 40 MG/1
1 TABLET TABLET, DELAYED RELEASE ORAL ONCE A DAY
Qty: 30 TABLET | Refills: 5 | Status: ACTIVE | COMMUNITY
Start: 2021-01-27

## 2025-03-19 ENCOUNTER — ERX REFILL RESPONSE (OUTPATIENT)
Dept: URBAN - METROPOLITAN AREA CLINIC 84 | Facility: CLINIC | Age: 40
End: 2025-03-19

## 2025-03-19 RX ORDER — LINACLOTIDE 145 UG/1
TAKE 1 CAPSULE (145 MCG TOTAL) BY MOUTH DAILY BEFORE 1ST MEAL OF THE DAY CAPSULE, GELATIN COATED ORAL
Qty: 30 CAPSULE | Refills: 8 | OUTPATIENT

## 2025-03-19 RX ORDER — LINACLOTIDE 145 UG/1
TAKE 1 CAPSULE (145 MCG TOTAL) BY MOUTH DAILY BEFORE 1ST MEAL OF THE DAY CAPSULE, GELATIN COATED ORAL
Qty: 30 CAPSULE | Refills: 7 | OUTPATIENT

## 2025-06-24 NOTE — PHYSICAL EXAM CHEST:
no lesions,  no deformities,  no traumatic injuries,  no significant scars are present,  chest wall non-tender,  no masses present, breathing is unlabored without accessory muscle use, normal breath sounds
There are no Wet Read(s) to document.